# Patient Record
(demographics unavailable — no encounter records)

---

## 2024-12-30 NOTE — HISTORY OF PRESENT ILLNESS
[FreeTextEntry1] : f/u pso [de-identified] : 32M last seen Oct 2023 by Dr. Chávez presenting today for f/u PsO.  Started keto shampoo, clobetasol solution, and 4d course of fluconazole 200 mg PO daily at .